# Patient Record
Sex: FEMALE | Race: WHITE | URBAN - METROPOLITAN AREA
[De-identification: names, ages, dates, MRNs, and addresses within clinical notes are randomized per-mention and may not be internally consistent; named-entity substitution may affect disease eponyms.]

---

## 2020-06-24 ENCOUNTER — LAB OUTSIDE AN ENCOUNTER (OUTPATIENT)
Dept: URBAN - METROPOLITAN AREA CLINIC 74 | Facility: CLINIC | Age: 35
End: 2020-06-24

## 2020-06-24 ENCOUNTER — OFFICE VISIT (OUTPATIENT)
Dept: URBAN - METROPOLITAN AREA CLINIC 74 | Facility: CLINIC | Age: 35
End: 2020-06-24
Payer: COMMERCIAL

## 2020-06-24 DIAGNOSIS — R63.4 WEIGHT LOSS: ICD-10-CM

## 2020-06-24 DIAGNOSIS — K62.5 RECTAL BLEEDING: ICD-10-CM

## 2020-06-24 DIAGNOSIS — R19.7 DIARRHEA, UNSPECIFIED TYPE: ICD-10-CM

## 2020-06-24 DIAGNOSIS — R10.30 LOWER ABDOMINAL PAIN: ICD-10-CM

## 2020-06-24 PROCEDURE — G9903 PT SCRN TBCO ID AS NON USER: HCPCS | Performed by: INTERNAL MEDICINE

## 2020-06-24 PROCEDURE — 82274 ASSAY TEST FOR BLOOD FECAL: CPT | Performed by: INTERNAL MEDICINE

## 2020-06-24 PROCEDURE — G8427 DOCREV CUR MEDS BY ELIG CLIN: HCPCS | Performed by: INTERNAL MEDICINE

## 2020-06-24 PROCEDURE — G8419 CALC BMI OUT NRM PARAM NOF/U: HCPCS | Performed by: INTERNAL MEDICINE

## 2020-06-24 PROCEDURE — 99204 OFFICE O/P NEW MOD 45 MIN: CPT | Performed by: INTERNAL MEDICINE

## 2020-06-24 PROCEDURE — 1036F TOBACCO NON-USER: CPT | Performed by: INTERNAL MEDICINE

## 2020-06-24 RX ORDER — DICYCLOMINE HYDROCHLORIDE 20 MG/1
1 TABLET TABLET ORAL THREE TIMES A DAY
Status: ACTIVE | COMMUNITY

## 2020-06-24 RX ORDER — MIRTAZAPINE 30 MG/1
1 TABLET AT BEDTIME TABLET, FILM COATED ORAL ONCE A DAY
Status: ACTIVE | COMMUNITY

## 2020-06-24 RX ORDER — SODIUM, POTASSIUM,MAG SULFATES 17.5-3.13G
345 ML SOLUTION, RECONSTITUTED, ORAL ORAL
Qty: 1 | Refills: 0 | OUTPATIENT

## 2020-06-24 RX ORDER — OMEPRAZOLE 20 MG/1
1 CAPSULE 30 MINUTES BEFORE MORNING MEAL CAPSULE, DELAYED RELEASE ORAL ONCE A DAY
Status: ACTIVE | COMMUNITY

## 2020-06-24 RX ORDER — DICYCLOMINE HYDROCHLORIDE 20 MG/1
1 TABLET TABLET ORAL THREE TIMES A DAY
Qty: 90

## 2020-06-24 RX ORDER — BUSPIRONE HYDROCHLORIDE 15 MG/1
1 TABLET TABLET ORAL TWICE A DAY
Status: ACTIVE | COMMUNITY

## 2020-06-24 RX ORDER — ALPRAZOLAM 0.5 MG/1
1 TABLET TABLET ORAL TWICE A DAY
Status: ACTIVE | COMMUNITY

## 2020-06-24 RX ORDER — ONDANSETRON HYDROCHLORIDE 4 MG/1
1 TABLET TABLET, FILM COATED ORAL ONCE A DAY
Status: ACTIVE | COMMUNITY

## 2020-06-24 NOTE — PHYSICAL EXAM CONSTITUTIONAL:
Overweight,well developed, well nourished , in no acute distress , ambulating without difficulty , normal communication ability

## 2020-06-24 NOTE — PHYSICAL EXAM GASTROINTESTINAL
Abdomen , soft, tender along both lower quadrants, non distended , no guarding or rigidity , no masses palpable , normal bowel sounds , Liver and Spleen , no hepatomegaly present , no hepatosplenomegaly , liver nontender , spleen not palpable

## 2020-06-24 NOTE — HPI-TODAY'S VISIT:
The patient is a 35-year-old white female patient of Dr. Bates who is referred for consultation, the patient presents with a two-week history of persistent diarrhea, 5-10 bowel movements per day, she claims that the diarrhea started after ingesting some fast food.  Because of the intensity of the diarrhea, nausea and abdominal pain she was seen in the emergency room within a period of 5 days twice.  The patient had a normal CBC, CMP, CT of the abdomen, lipase, and stools were negative for pathogens, C. difficile and Giardia.  The patient was treated with dicyclomine 20 mg before meals and at bedtime with poor results, she continued to have loose bowel movements, lower abdominal cramping pain and nausea for which she took Zofran.  The patient has a history of anxiety and panic attacks and currently is under the care of psychiatry.  The patient has been seen by [facility] [place] and was diagnosed with H. pylori, was treated but did not have a follow-up to confirm success.  The patient was found at the time to have a lipase elevation and had a normal MRI.  Records from [facility] will be obtained for review.  Currently she continues to complain of 5-10 bowel movements per day, most of them postprandial, almost none during the night, the patient continues to complain of cramping abdominal pain across the lower abdomen, has seen red blood in the stool.  The stool is described as semi-liquid or liquid.  The patient has nausea with no vomiting.  She denied any upper GI symptoms such as dysphagia, odynophagia, heartburn.  She claims that she has lost 8 pounds in 2 weeks.  The patient is to be scheduled for a colonoscopy, benefits potential complications and alternatives to colonoscopy where disclosed.  Stools will be checked for blood, pancreatic elastase, fecal fat, we will obtain a GPP 22, we will get a C-reactive protein and H. pylori breath test.  The patient will remain on a dairy free diet and will restart dicyclomine 20 mg before meals and at bedtime.  The patient will return to the office after completion of the evaluation.

## 2020-06-25 ENCOUNTER — LAB OUTSIDE AN ENCOUNTER (OUTPATIENT)
Dept: URBAN - METROPOLITAN AREA CLINIC 74 | Facility: CLINIC | Age: 35
End: 2020-06-25

## 2020-06-26 ENCOUNTER — TELEPHONE ENCOUNTER (OUTPATIENT)
Dept: URBAN - METROPOLITAN AREA CLINIC 92 | Facility: CLINIC | Age: 35
End: 2020-06-26

## 2020-06-29 LAB — GASTROINTESTINAL PATHOGEN: (no result)

## 2020-07-02 ENCOUNTER — OFFICE VISIT (OUTPATIENT)
Dept: URBAN - METROPOLITAN AREA CLINIC 73 | Facility: CLINIC | Age: 35
End: 2020-07-02

## 2020-07-02 ENCOUNTER — OFFICE VISIT (OUTPATIENT)
Dept: URBAN - METROPOLITAN AREA CLINIC 73 | Facility: CLINIC | Age: 35
End: 2020-07-02
Payer: COMMERCIAL

## 2020-07-02 DIAGNOSIS — R19.7 DIARRHEA: ICD-10-CM

## 2020-07-02 PROCEDURE — 82274 ASSAY TEST FOR BLOOD FECAL: CPT | Performed by: INTERNAL MEDICINE

## 2020-07-02 RX ORDER — MIRTAZAPINE 30 MG/1
1 TABLET AT BEDTIME TABLET, FILM COATED ORAL ONCE A DAY
Status: ACTIVE | COMMUNITY

## 2020-07-02 RX ORDER — BUSPIRONE HYDROCHLORIDE 15 MG/1
1 TABLET TABLET ORAL TWICE A DAY
Status: ACTIVE | COMMUNITY

## 2020-07-02 RX ORDER — OMEPRAZOLE 20 MG/1
1 CAPSULE 30 MINUTES BEFORE MORNING MEAL CAPSULE, DELAYED RELEASE ORAL ONCE A DAY
Status: ACTIVE | COMMUNITY

## 2020-07-02 RX ORDER — DICYCLOMINE HYDROCHLORIDE 20 MG/1
1 TABLET TABLET ORAL THREE TIMES A DAY
Qty: 90 | Status: ACTIVE | COMMUNITY

## 2020-07-02 RX ORDER — ONDANSETRON HYDROCHLORIDE 4 MG/1
1 TABLET TABLET, FILM COATED ORAL ONCE A DAY
Status: ACTIVE | COMMUNITY

## 2020-07-02 RX ORDER — SODIUM, POTASSIUM,MAG SULFATES 17.5-3.13G
345 ML SOLUTION, RECONSTITUTED, ORAL ORAL
Qty: 1 | Refills: 0 | Status: ACTIVE | COMMUNITY

## 2020-07-02 RX ORDER — ALPRAZOLAM 0.5 MG/1
1 TABLET TABLET ORAL TWICE A DAY
Status: ACTIVE | COMMUNITY

## 2020-07-04 LAB
C-REACTIVE PROTEIN, QUANT: <1
H PYLORI BREATH TEST: NEGATIVE

## 2020-07-06 ENCOUNTER — OFFICE VISIT (OUTPATIENT)
Dept: URBAN - METROPOLITAN AREA SURGERY CENTER 30 | Facility: SURGERY CENTER | Age: 35
End: 2020-07-06
Payer: COMMERCIAL

## 2020-07-06 ENCOUNTER — CLAIMS CREATED FROM THE CLAIM WINDOW (OUTPATIENT)
Dept: URBAN - METROPOLITAN AREA CLINIC 4 | Facility: CLINIC | Age: 35
End: 2020-07-06
Payer: COMMERCIAL

## 2020-07-06 DIAGNOSIS — R19.7 DIARRHEA, UNSPECIFIED: ICD-10-CM

## 2020-07-06 DIAGNOSIS — R19.7 ACUTE DIARRHEA: ICD-10-CM

## 2020-07-06 DIAGNOSIS — R10.30 ABDOMINAL PAIN OF UNKNOWN CAUSE: ICD-10-CM

## 2020-07-06 DIAGNOSIS — K62.5 ANAL BLEEDING: ICD-10-CM

## 2020-07-06 DIAGNOSIS — R10.9 UNSPECIFIED ABDOMINAL PAIN: ICD-10-CM

## 2020-07-06 PROCEDURE — 88305 TISSUE EXAM BY PATHOLOGIST: CPT | Performed by: PATHOLOGY

## 2020-07-06 PROCEDURE — G8907 PT DOC NO EVENTS ON DISCHARG: HCPCS | Performed by: INTERNAL MEDICINE

## 2020-07-06 PROCEDURE — 45380 COLONOSCOPY AND BIOPSY: CPT | Performed by: INTERNAL MEDICINE

## 2020-07-06 PROCEDURE — G9937 DIG OR SURV COLSCO: HCPCS | Performed by: INTERNAL MEDICINE

## 2020-07-10 ENCOUNTER — TELEPHONE ENCOUNTER (OUTPATIENT)
Dept: URBAN - METROPOLITAN AREA CLINIC 74 | Facility: CLINIC | Age: 35
End: 2020-07-10

## 2020-07-22 ENCOUNTER — LAB OUTSIDE AN ENCOUNTER (OUTPATIENT)
Dept: URBAN - METROPOLITAN AREA CLINIC 74 | Facility: CLINIC | Age: 35
End: 2020-07-22

## 2020-07-22 ENCOUNTER — OFFICE VISIT (OUTPATIENT)
Dept: URBAN - METROPOLITAN AREA CLINIC 74 | Facility: CLINIC | Age: 35
End: 2020-07-22
Payer: COMMERCIAL

## 2020-07-22 ENCOUNTER — OFFICE VISIT (OUTPATIENT)
Dept: URBAN - METROPOLITAN AREA CLINIC 74 | Facility: CLINIC | Age: 35
End: 2020-07-22

## 2020-07-22 DIAGNOSIS — F41.9 ANXIETY: ICD-10-CM

## 2020-07-22 DIAGNOSIS — K64.8 INTERNAL HEMORRHOIDS: ICD-10-CM

## 2020-07-22 DIAGNOSIS — F32.9 ACUTE DEPRESSION: ICD-10-CM

## 2020-07-22 DIAGNOSIS — R10.13 DYSPEPSIA: ICD-10-CM

## 2020-07-22 DIAGNOSIS — K62.5 RECTAL BLEEDING: ICD-10-CM

## 2020-07-22 DIAGNOSIS — K58.0 IRRITABLE BOWEL SYNDROME WITH DIARRHEA: ICD-10-CM

## 2020-07-22 DIAGNOSIS — R19.7 DIARRHEA, UNSPECIFIED TYPE: ICD-10-CM

## 2020-07-22 DIAGNOSIS — R14.0 BLOATING: ICD-10-CM

## 2020-07-22 DIAGNOSIS — R10.84 GENERALIZED ABDOMINAL PAIN: ICD-10-CM

## 2020-07-22 DIAGNOSIS — R63.4 WEIGHT LOSS: ICD-10-CM

## 2020-07-22 DIAGNOSIS — K21.9 GASTROESOPHAGEAL REFLUX DISEASE, ESOPHAGITIS PRESENCE NOT SPECIFIED: ICD-10-CM

## 2020-07-22 PROCEDURE — 99213 OFFICE O/P EST LOW 20 MIN: CPT | Performed by: INTERNAL MEDICINE

## 2020-07-22 PROCEDURE — 1036F TOBACCO NON-USER: CPT | Performed by: INTERNAL MEDICINE

## 2020-07-22 PROCEDURE — G8419 CALC BMI OUT NRM PARAM NOF/U: HCPCS | Performed by: INTERNAL MEDICINE

## 2020-07-22 PROCEDURE — G8427 DOCREV CUR MEDS BY ELIG CLIN: HCPCS | Performed by: INTERNAL MEDICINE

## 2020-07-22 RX ORDER — ALPRAZOLAM 0.5 MG/1
1 TABLET TABLET ORAL TWICE A DAY
Status: ACTIVE | COMMUNITY

## 2020-07-22 RX ORDER — MIRTAZAPINE 30 MG/1
1 TABLET AT BEDTIME TABLET, FILM COATED ORAL ONCE A DAY
Status: ACTIVE | COMMUNITY

## 2020-07-22 RX ORDER — DICYCLOMINE HYDROCHLORIDE 20 MG/1
1 TABLET TABLET ORAL THREE TIMES A DAY
Qty: 90 | Status: ACTIVE | COMMUNITY

## 2020-07-22 RX ORDER — OMEPRAZOLE 20 MG/1
1 CAPSULE 30 MINUTES BEFORE MORNING MEAL CAPSULE, DELAYED RELEASE ORAL ONCE A DAY
OUTPATIENT

## 2020-07-22 RX ORDER — SODIUM, POTASSIUM,MAG SULFATES 17.5-3.13G
345 ML SOLUTION, RECONSTITUTED, ORAL ORAL
Qty: 1 | Refills: 0 | Status: DISCONTINUED | COMMUNITY

## 2020-07-22 RX ORDER — OMEPRAZOLE 20 MG/1
1 CAPSULE 30 MINUTES BEFORE MORNING MEAL CAPSULE, DELAYED RELEASE ORAL ONCE A DAY
Status: ACTIVE | COMMUNITY

## 2020-07-22 RX ORDER — ONDANSETRON HYDROCHLORIDE 4 MG/1
1 TABLET TABLET, FILM COATED ORAL ONCE A DAY
Status: ACTIVE | COMMUNITY

## 2020-07-22 RX ORDER — DICYCLOMINE HYDROCHLORIDE 20 MG/1
1 TABLET TABLET ORAL THREE TIMES A DAY
OUTPATIENT

## 2020-07-22 RX ORDER — BUSPIRONE HYDROCHLORIDE 15 MG/1
1 TABLET TABLET ORAL TWICE A DAY
Status: ACTIVE | COMMUNITY

## 2020-07-22 RX ORDER — ONDANSETRON HYDROCHLORIDE 4 MG/1
1 TABLET TABLET, FILM COATED ORAL ONCE A DAY
Qty: 30

## 2020-07-22 NOTE — HPI-TODAY'S VISIT:
Today July 22, 2020 the patient returns for a follow-up visit, the patient was last seen in the office on June 24, 2020, at that time the patient was being evaluated for diarrhea, rectal bleeding and lower abdominal pain as well as weight loss, persistent bloating and nausea and a history of H. pylori infection. The patient had a negative GPP 14, the C-reactive protein was under 1 and the H. pylori breath test was negative.  The patient had a negative Hemosure. The patient had a colonoscopy on June 6, 2020, the colonoscopy revealed nonbleeding internal hemorrhoids, normal colonic mucosa and normal ileal mucosa, biopsies were normal, there was no evidence of ileitis or microscopic colitis. Stool for pancreatic elastase and fecal fat are pending. The patient continues to be significantly symptomatic, the patient states that she wakes up at 5:30 in the AM to defecate, has loose bowel movements, on the average 3 in the morning, after defecation she gets cramping abdominal pain, an ill feeling, nausea without vomiting, generalized malaise which should gradually disappear after taking her medications for anxiety. The patient also complains of gastroesophageal reflux and vague upper abdominal pain mostly epigastric. The patient will be scheduled to have an EGD, right upper quadrant ultrasound.  The patient will increase the dicyclomine to before meals and at bedtime 20 mg.  She is to discuss with her psychiatrist the use of amitriptyline for irritable bowel syndrome, the patient takes multiple medications for depression and anxiety. I discussed benefits potential complications and alternatives to EGD with the patient.

## 2020-07-23 ENCOUNTER — LAB OUTSIDE AN ENCOUNTER (OUTPATIENT)
Dept: URBAN - METROPOLITAN AREA CLINIC 74 | Facility: CLINIC | Age: 35
End: 2020-07-23

## 2020-07-30 LAB
FATS, NEUTRAL: NORMAL
FATS, TOTAL: NORMAL
PANCREATIC ELASTASE, FECAL: >500

## 2020-08-03 ENCOUNTER — ERX REFILL RESPONSE (OUTPATIENT)
Dept: URBAN - METROPOLITAN AREA CLINIC 74 | Facility: CLINIC | Age: 35
End: 2020-08-03

## 2020-08-03 ENCOUNTER — TELEPHONE ENCOUNTER (OUTPATIENT)
Dept: URBAN - METROPOLITAN AREA CLINIC 74 | Facility: CLINIC | Age: 35
End: 2020-08-03

## 2020-08-03 RX ORDER — DICYCLOMINE HYDROCHLORIDE 20 MG/1
1 TABLET TABLET ORAL
Qty: 360 TABLET | Refills: 1

## 2020-08-03 RX ORDER — DICYCLOMINE HYDROCHLORIDE 20 MG/1
1 TABLET TABLET ORAL THREE TIMES A DAY
Qty: 90 | Refills: 0

## 2020-08-04 ENCOUNTER — CLAIMS CREATED FROM THE CLAIM WINDOW (OUTPATIENT)
Dept: URBAN - METROPOLITAN AREA CLINIC 4 | Facility: CLINIC | Age: 35
End: 2020-08-04
Payer: COMMERCIAL

## 2020-08-04 ENCOUNTER — OFFICE VISIT (OUTPATIENT)
Dept: URBAN - METROPOLITAN AREA SURGERY CENTER 30 | Facility: SURGERY CENTER | Age: 35
End: 2020-08-04
Payer: COMMERCIAL

## 2020-08-04 DIAGNOSIS — K31.89 OTHER DISEASES OF STOMACH AND DUODENUM: ICD-10-CM

## 2020-08-04 DIAGNOSIS — K31.89 ACQUIRED DEFORMITY OF PYLORUS: ICD-10-CM

## 2020-08-04 DIAGNOSIS — K21.0 GASTRO-ESOPHAGEAL REFLUX DISEASE WITH ESOPHAGITIS: ICD-10-CM

## 2020-08-04 DIAGNOSIS — K21.9 ACID REFLUX: ICD-10-CM

## 2020-08-04 PROCEDURE — 43239 EGD BIOPSY SINGLE/MULTIPLE: CPT | Performed by: INTERNAL MEDICINE

## 2020-08-04 PROCEDURE — 88342 IMHCHEM/IMCYTCHM 1ST ANTB: CPT | Performed by: PATHOLOGY

## 2020-08-04 PROCEDURE — G8907 PT DOC NO EVENTS ON DISCHARG: HCPCS | Performed by: INTERNAL MEDICINE

## 2020-08-04 PROCEDURE — 88312 SPECIAL STAINS GROUP 1: CPT | Performed by: PATHOLOGY

## 2020-08-04 PROCEDURE — 88305 TISSUE EXAM BY PATHOLOGIST: CPT | Performed by: PATHOLOGY

## 2020-08-04 RX ORDER — ONDANSETRON HYDROCHLORIDE 4 MG/1
1 TABLET TABLET, FILM COATED ORAL ONCE A DAY
Qty: 30 | Status: ACTIVE | COMMUNITY

## 2020-08-04 RX ORDER — MIRTAZAPINE 30 MG/1
1 TABLET AT BEDTIME TABLET, FILM COATED ORAL ONCE A DAY
Status: ACTIVE | COMMUNITY

## 2020-08-04 RX ORDER — BUSPIRONE HYDROCHLORIDE 15 MG/1
1 TABLET TABLET ORAL TWICE A DAY
Status: ACTIVE | COMMUNITY

## 2020-08-04 RX ORDER — OMEPRAZOLE 20 MG/1
1 CAPSULE 30 MINUTES BEFORE MORNING MEAL CAPSULE, DELAYED RELEASE ORAL ONCE A DAY
Status: ACTIVE | COMMUNITY

## 2020-08-04 RX ORDER — DICYCLOMINE HYDROCHLORIDE 20 MG/1
1 TABLET TABLET ORAL
Qty: 360 TABLET | Refills: 1 | Status: ACTIVE | COMMUNITY

## 2020-08-04 RX ORDER — ALPRAZOLAM 0.5 MG/1
1 TABLET TABLET ORAL TWICE A DAY
Status: ACTIVE | COMMUNITY

## 2020-08-19 ENCOUNTER — WEB ENCOUNTER (OUTPATIENT)
Dept: URBAN - METROPOLITAN AREA CLINIC 74 | Facility: CLINIC | Age: 35
End: 2020-08-19

## 2020-08-19 ENCOUNTER — OFFICE VISIT (OUTPATIENT)
Dept: URBAN - METROPOLITAN AREA CLINIC 74 | Facility: CLINIC | Age: 35
End: 2020-08-19
Payer: COMMERCIAL

## 2020-08-19 ENCOUNTER — TELEPHONE ENCOUNTER (OUTPATIENT)
Dept: URBAN - METROPOLITAN AREA CLINIC 74 | Facility: CLINIC | Age: 35
End: 2020-08-19

## 2020-08-19 DIAGNOSIS — K44.9 HIATAL HERNIA: ICD-10-CM

## 2020-08-19 DIAGNOSIS — K64.8 INTERNAL HEMORRHOIDS: ICD-10-CM

## 2020-08-19 DIAGNOSIS — K21.9 GERD WITHOUT ESOPHAGITIS: ICD-10-CM

## 2020-08-19 DIAGNOSIS — K58.0 IRRITABLE BOWEL SYNDROME WITH DIARRHEA: ICD-10-CM

## 2020-08-19 DIAGNOSIS — F41.8 OTHER SPECIFIED ANXIETY DISORDERS: ICD-10-CM

## 2020-08-19 PROBLEM — 197480006: Status: ACTIVE | Noted: 2020-06-24

## 2020-08-19 PROBLEM — 90458007: Status: ACTIVE | Noted: 2020-08-18

## 2020-08-19 PROBLEM — 266435005: Status: ACTIVE | Noted: 2020-08-18

## 2020-08-19 PROBLEM — 84089009: Status: ACTIVE | Noted: 2020-08-19

## 2020-08-19 PROBLEM — 197125005: Status: ACTIVE | Noted: 2020-07-22

## 2020-08-19 PROCEDURE — G8427 DOCREV CUR MEDS BY ELIG CLIN: HCPCS | Performed by: INTERNAL MEDICINE

## 2020-08-19 PROCEDURE — 99213 OFFICE O/P EST LOW 20 MIN: CPT | Performed by: INTERNAL MEDICINE

## 2020-08-19 PROCEDURE — 1036F TOBACCO NON-USER: CPT | Performed by: INTERNAL MEDICINE

## 2020-08-19 PROCEDURE — G8419 CALC BMI OUT NRM PARAM NOF/U: HCPCS | Performed by: INTERNAL MEDICINE

## 2020-08-19 RX ORDER — BUSPIRONE HYDROCHLORIDE 15 MG/1
1 TABLET TABLET ORAL TWICE A DAY
Status: ACTIVE | COMMUNITY

## 2020-08-19 RX ORDER — DICYCLOMINE HYDROCHLORIDE 20 MG/1
1 TABLET TABLET ORAL
Qty: 360 TABLET | Refills: 1 | Status: ACTIVE | COMMUNITY

## 2020-08-19 RX ORDER — DICYCLOMINE HYDROCHLORIDE 20 MG/1
1 TABLET TABLET ORAL
OUTPATIENT

## 2020-08-19 RX ORDER — ELUXADOLINE 75 MG/1
1 TABLET WITH FOOD TABLET, FILM COATED ORAL TWICE A DAY
Qty: 60 | Refills: 1 | OUTPATIENT
Start: 2020-08-19 | End: 2020-10-18

## 2020-08-19 RX ORDER — ELUXADOLINE 75 MG/1
1 TABLET WITH FOOD TABLET, FILM COATED ORAL TWICE A DAY
OUTPATIENT
Start: 2020-08-19 | End: 2020-10-18

## 2020-08-19 RX ORDER — OMEPRAZOLE 20 MG/1
1 CAPSULE 30 MINUTES BEFORE MORNING MEAL CAPSULE, DELAYED RELEASE ORAL ONCE A DAY
Status: ACTIVE | COMMUNITY

## 2020-08-19 RX ORDER — ALPRAZOLAM 0.5 MG/1
1 TABLET TABLET ORAL TWICE A DAY
Status: ACTIVE | COMMUNITY

## 2020-08-19 RX ORDER — MIRTAZAPINE 30 MG/1
1 TABLET AT BEDTIME TABLET, FILM COATED ORAL ONCE A DAY
Status: ACTIVE | COMMUNITY

## 2020-08-19 RX ORDER — ONDANSETRON HYDROCHLORIDE 4 MG/1
1 TABLET TABLET, FILM COATED ORAL ONCE A DAY
Qty: 30 | Status: ACTIVE | COMMUNITY

## 2020-08-19 NOTE — HPI-TODAY'S VISIT:
Today July 22, 2020 the patient returns for a follow-up visit, the patient was last seen in the office on June 24, 2020, at that time the patient was being evaluated for diarrhea, rectal bleeding and lower abdominal pain as well as weight loss, persistent bloating and nausea and a history of H. pylori infection. The patient had a negative GPP 14, the C-reactive protein was under 1 and the H. pylori breath test was negative.  The patient had a negative Hemosure. The patient had a colonoscopy on June 6, 2020, the colonoscopy revealed nonbleeding internal hemorrhoids, normal colonic mucosa and normal ileal mucosa, biopsies were normal, there was no evidence of ileitis or microscopic colitis. Stool for pancreatic elastase and fecal fat are pending. The patient continues to be significantly symptomatic, the patient states that she wakes up at 5:30 in the AM to defecate, has loose bowel movements, on the average 3 in the morning, after defecation she gets cramping abdominal pain, an ill feeling, nausea without vomiting, generalized malaise which should gradually disappear after taking her medications for anxiety. The patient also complains of gastroesophageal reflux and vague upper abdominal pain mostly epigastric. The patient will be scheduled to have an EGD, right upper quadrant ultrasound.  The patient will increase the dicyclomine to before meals and at bedtime 20 mg.  She is to discuss with her psychiatrist the use of amitriptyline for irritable bowel syndrome, the patient takes multiple medications for depression and anxiety. I discussed benefits potential complications and alternatives to EGD with the patient. Today August 19, 2020 the patient returns for a follow-up visit, the patient was last seen on July 22, 2020 In between June 24, 2020 and July 22, 2020 the patient had been evaluated for diarrhea, rectal bleeding, lower abdominal pain as well as weight loss, she also complaint of persistent bloating, nausea and history of H. pylori infection.  The patient did have a negative GPP 14, had a normal C-reactive protein of 1, had a negative H. pylori breath test, had a negative Hemosure.  The patient had a colonoscopy June 6, 2020 which revealed normal colonic mucosa and normal ileal mucosa, the only positive findings were internal hemorrhoids with no evidence of bleeding.  Stools for pancreatic elastase and fecal fat were pending.  During the July 22, 2020 visit the patient continued to complain of significant symptoms, she stated that she was awakened at 5:30 in the AM to defecate loose bowel movements, on the average 3 BMs in the morning, following defecation she would get cramping abdominal pain, an ill feeling, nausea without vomiting, reflux and vague upper abdominal discomfort mostly over the epigastrium.  The patient was scheduled to have an EGD, right upper quadrant abdominal ultrasound and was advised to increase the dicyclomine to 20 mg before meals and at bedtime.  She was also to discuss with a psychiatrist the use of amitriptyline for irritable bowel syndrome, the patient was previously prescribed multiple medications for depression and anxiety. The patient's pancreatic elastase was reported normal and fecal fats were also normal. The right upper quadrant ultrasound performed July 24, 2020 revealed a normal liver, the portion of the pancreas visualized was normal, there were no gallstones, the common bile duct was normal and the right kidney showed no abnormalities. The patient's EGD was done August 4, 2020 and it showed a medium sized hiatal hernia, esophageal biopsies showed squamous mucosa with reflux type changes, no evidence of Payne's esophagus, gastric and duodenal biopsies were normal. The patient continues to have diffuse abdominal pain, mostly cramping, she continues to have in between 2-4 loose bowel movements in the a.m.  The patient has not responded to therapy with dicyclomine, we will replace it with Viberzi 75 mg twice daily with food. The patient states that the psychiatrist does not want to have her start amitriptyline.  The patient may need to be seen in the pain clinic for a second opinion.  She will return for a follow-up visit in 30 days.

## 2020-08-26 ENCOUNTER — TELEPHONE ENCOUNTER (OUTPATIENT)
Dept: URBAN - METROPOLITAN AREA CLINIC 92 | Facility: CLINIC | Age: 35
End: 2020-08-26

## 2020-08-26 RX ORDER — ONDANSETRON HYDROCHLORIDE 4 MG/1
1 TABLET TABLET, FILM COATED ORAL ONCE A DAY
Qty: 30 TABLET | Refills: 3

## 2020-08-26 RX ORDER — DICYCLOMINE HYDROCHLORIDE 20 MG/1
1 TABLET TABLET ORAL
Qty: 120 TABLET | Refills: 3 | OUTPATIENT
Start: 2020-08-26 | End: 2020-12-23

## 2020-09-16 ENCOUNTER — DASHBOARD ENCOUNTERS (OUTPATIENT)
Age: 35
End: 2020-09-16

## 2020-09-21 ENCOUNTER — OFFICE VISIT (OUTPATIENT)
Dept: URBAN - METROPOLITAN AREA CLINIC 74 | Facility: CLINIC | Age: 35
End: 2020-09-21

## 2020-09-21 RX ORDER — OMEPRAZOLE 20 MG/1
1 CAPSULE 30 MINUTES BEFORE MORNING MEAL CAPSULE, DELAYED RELEASE ORAL ONCE A DAY
Status: ACTIVE | COMMUNITY

## 2020-09-21 RX ORDER — BUSPIRONE HYDROCHLORIDE 15 MG/1
1 TABLET TABLET ORAL TWICE A DAY
Status: ACTIVE | COMMUNITY

## 2020-09-21 RX ORDER — DICYCLOMINE HYDROCHLORIDE 20 MG/1
1 TABLET TABLET ORAL
Qty: 120 TABLET | Refills: 3 | Status: ACTIVE | COMMUNITY
Start: 2020-08-26 | End: 2020-12-23

## 2020-09-21 RX ORDER — MIRTAZAPINE 30 MG/1
1 TABLET AT BEDTIME TABLET, FILM COATED ORAL ONCE A DAY
Status: ACTIVE | COMMUNITY

## 2020-09-21 RX ORDER — ONDANSETRON HYDROCHLORIDE 4 MG/1
1 TABLET TABLET, FILM COATED ORAL ONCE A DAY
Qty: 30 TABLET | Refills: 3 | Status: ACTIVE | COMMUNITY

## 2020-09-21 RX ORDER — ALPRAZOLAM 0.5 MG/1
1 TABLET TABLET ORAL TWICE A DAY
Status: ACTIVE | COMMUNITY